# Patient Record
Sex: FEMALE | Race: WHITE
[De-identification: names, ages, dates, MRNs, and addresses within clinical notes are randomized per-mention and may not be internally consistent; named-entity substitution may affect disease eponyms.]

---

## 2019-11-19 ENCOUNTER — HOSPITAL ENCOUNTER (OUTPATIENT)
Dept: HOSPITAL 75 - LAB | Age: 69
End: 2019-11-19
Attending: FAMILY MEDICINE
Payer: SELF-PAY

## 2019-11-19 DIAGNOSIS — R42: ICD-10-CM

## 2019-11-19 DIAGNOSIS — E78.5: ICD-10-CM

## 2019-11-19 DIAGNOSIS — I10: Primary | ICD-10-CM

## 2019-11-19 LAB
ALBUMIN SERPL-MCNC: 4.8 GM/DL (ref 3.2–4.5)
ALP SERPL-CCNC: 73 U/L (ref 40–136)
ALT SERPL-CCNC: 18 U/L (ref 0–55)
BILIRUB SERPL-MCNC: 0.8 MG/DL (ref 0.1–1)
BUN/CREAT SERPL: 21
CALCIUM SERPL-MCNC: 9.7 MG/DL (ref 8.5–10.1)
CHLORIDE SERPL-SCNC: 104 MMOL/L (ref 98–107)
CHOLEST SERPL-MCNC: 191 MG/DL (ref ?–200)
CO2 SERPL-SCNC: 27 MMOL/L (ref 21–32)
CREAT SERPL-MCNC: 1.06 MG/DL (ref 0.6–1.3)
ERYTHROCYTE [DISTWIDTH] IN BLOOD BY AUTOMATED COUNT: 14.8 % (ref 10–14.5)
GFR SERPLBLD BASED ON 1.73 SQ M-ARVRAT: 51 ML/MIN
GLUCOSE SERPL-MCNC: 113 MG/DL (ref 70–105)
HCT VFR BLD CALC: 40 % (ref 35–52)
HDLC SERPL-MCNC: 53 MG/DL (ref 40–60)
HGB BLD-MCNC: 13 G/DL (ref 11.5–16)
MCH RBC QN AUTO: 28 PG (ref 25–34)
MCHC RBC AUTO-ENTMCNC: 33 G/DL (ref 32–36)
MCV RBC AUTO: 85 FL (ref 80–99)
PLATELET # BLD: 215 10^3/UL (ref 130–400)
PMV BLD AUTO: 10 FL (ref 7.4–10.4)
POTASSIUM SERPL-SCNC: 3.8 MMOL/L (ref 3.6–5)
PROT SERPL-MCNC: 7.6 GM/DL (ref 6.4–8.2)
SODIUM SERPL-SCNC: 141 MMOL/L (ref 135–145)
TRIGL SERPL-MCNC: 231 MG/DL (ref ?–150)
VLDLC SERPL CALC-MCNC: 46 MG/DL (ref 5–40)
WBC # BLD AUTO: 5.3 10^3/UL (ref 4.3–11)

## 2019-11-19 PROCEDURE — 84443 ASSAY THYROID STIM HORMONE: CPT

## 2019-11-19 PROCEDURE — 80061 LIPID PANEL: CPT

## 2019-11-19 PROCEDURE — 80053 COMPREHEN METABOLIC PANEL: CPT

## 2019-11-19 PROCEDURE — 85027 COMPLETE CBC AUTOMATED: CPT

## 2019-11-19 PROCEDURE — 36415 COLL VENOUS BLD VENIPUNCTURE: CPT

## 2021-12-15 ENCOUNTER — HOSPITAL ENCOUNTER (OUTPATIENT)
Dept: HOSPITAL 75 - LAB | Age: 71
End: 2021-12-15
Attending: FAMILY MEDICINE
Payer: SELF-PAY

## 2021-12-15 DIAGNOSIS — I10: Primary | ICD-10-CM

## 2021-12-15 DIAGNOSIS — E78.5: ICD-10-CM

## 2021-12-15 LAB
ALBUMIN SERPL-MCNC: 4.4 GM/DL (ref 3.2–4.5)
ALP SERPL-CCNC: 74 U/L (ref 40–136)
ALT SERPL-CCNC: 28 U/L (ref 0–55)
BILIRUB SERPL-MCNC: 0.9 MG/DL (ref 0.1–1)
BUN/CREAT SERPL: 18
CALCIUM SERPL-MCNC: 9.9 MG/DL (ref 8.5–10.1)
CHLORIDE SERPL-SCNC: 105 MMOL/L (ref 98–107)
CHOLEST SERPL-MCNC: 193 MG/DL (ref ?–200)
CO2 SERPL-SCNC: 21 MMOL/L (ref 21–32)
CREAT SERPL-MCNC: 0.96 MG/DL (ref 0.6–1.3)
GFR SERPLBLD BASED ON 1.73 SQ M-ARVRAT: 57 ML/MIN
GLUCOSE SERPL-MCNC: 115 MG/DL (ref 70–105)
HCT VFR BLD CALC: 37 % (ref 35–52)
HDLC SERPL-MCNC: 53 MG/DL (ref 40–60)
HGB BLD-MCNC: 12.3 G/DL (ref 11.5–16)
MCH RBC QN AUTO: 28 PG (ref 25–34)
MCHC RBC AUTO-ENTMCNC: 34 G/DL (ref 32–36)
MCV RBC AUTO: 84 FL (ref 80–99)
PLATELET # BLD: 220 10^3/UL (ref 130–400)
PMV BLD AUTO: 10.5 FL (ref 9–12.2)
POTASSIUM SERPL-SCNC: 3.7 MMOL/L (ref 3.6–5)
PROT SERPL-MCNC: 7.2 GM/DL (ref 6.4–8.2)
SODIUM SERPL-SCNC: 141 MMOL/L (ref 135–145)
TRIGL SERPL-MCNC: 379 MG/DL (ref ?–150)
VLDLC SERPL CALC-MCNC: 76 MG/DL (ref 5–40)
WBC # BLD AUTO: 6.6 10^3/UL (ref 4.3–11)

## 2021-12-15 PROCEDURE — 36415 COLL VENOUS BLD VENIPUNCTURE: CPT

## 2021-12-15 PROCEDURE — 80061 LIPID PANEL: CPT

## 2021-12-15 PROCEDURE — 85027 COMPLETE CBC AUTOMATED: CPT

## 2021-12-15 PROCEDURE — 80053 COMPREHEN METABOLIC PANEL: CPT

## 2023-06-29 ENCOUNTER — HOSPITAL ENCOUNTER (OUTPATIENT)
Dept: HOSPITAL 75 - LAB | Age: 73
End: 2023-06-29
Attending: FAMILY MEDICINE
Payer: SELF-PAY

## 2023-06-29 DIAGNOSIS — I10: Primary | ICD-10-CM

## 2023-06-29 DIAGNOSIS — E78.5: ICD-10-CM

## 2023-06-29 LAB
ALBUMIN SERPL-MCNC: 4.6 GM/DL (ref 3.2–4.5)
ALP SERPL-CCNC: 65 U/L (ref 40–136)
ALT SERPL-CCNC: 20 U/L (ref 0–55)
BILIRUB SERPL-MCNC: 0.9 MG/DL (ref 0.1–1)
BUN/CREAT SERPL: 19
CALCIUM SERPL-MCNC: 9.9 MG/DL (ref 8.5–10.1)
CHLORIDE SERPL-SCNC: 107 MMOL/L (ref 98–107)
CHOLEST SERPL-MCNC: 185 MG/DL (ref ?–200)
CO2 SERPL-SCNC: 22 MMOL/L (ref 21–32)
CREAT SERPL-MCNC: 1.17 MG/DL (ref 0.6–1.3)
GFR SERPLBLD BASED ON 1.73 SQ M-ARVRAT: 49 ML/MIN
GLUCOSE SERPL-MCNC: 116 MG/DL (ref 70–105)
HCT VFR BLD CALC: 39 % (ref 35–52)
HDLC SERPL-MCNC: 53 MG/DL (ref 40–60)
HGB BLD-MCNC: 13 G/DL (ref 11.5–16)
MCH RBC QN AUTO: 29 PG (ref 25–34)
MCHC RBC AUTO-ENTMCNC: 33 G/DL (ref 32–36)
MCV RBC AUTO: 86 FL (ref 80–99)
PLATELET # BLD: 204 10^3/UL (ref 130–400)
PMV BLD AUTO: 10.2 FL (ref 9–12.2)
POTASSIUM SERPL-SCNC: 4.1 MMOL/L (ref 3.6–5)
PROT SERPL-MCNC: 7.4 GM/DL (ref 6.4–8.2)
SODIUM SERPL-SCNC: 141 MMOL/L (ref 135–145)
TRIGL SERPL-MCNC: 346 MG/DL (ref ?–150)
VLDLC SERPL CALC-MCNC: 69 MG/DL (ref 5–40)
WBC # BLD AUTO: 6.8 10^3/UL (ref 4.3–11)

## 2023-06-29 PROCEDURE — 80061 LIPID PANEL: CPT

## 2023-06-29 PROCEDURE — 84443 ASSAY THYROID STIM HORMONE: CPT

## 2023-06-29 PROCEDURE — 85027 COMPLETE CBC AUTOMATED: CPT

## 2023-06-29 PROCEDURE — 36415 COLL VENOUS BLD VENIPUNCTURE: CPT

## 2023-06-29 PROCEDURE — 80053 COMPREHEN METABOLIC PANEL: CPT

## 2023-09-15 ENCOUNTER — HOSPITAL ENCOUNTER (OUTPATIENT)
Dept: HOSPITAL 75 - LAB | Age: 73
End: 2023-09-15
Attending: FAMILY MEDICINE
Payer: SELF-PAY

## 2023-09-15 DIAGNOSIS — D68.51: Primary | ICD-10-CM

## 2023-09-15 PROCEDURE — 36415 COLL VENOUS BLD VENIPUNCTURE: CPT

## 2023-09-15 PROCEDURE — 81241 F5 GENE: CPT

## 2023-09-19 ENCOUNTER — HOSPITAL ENCOUNTER (OUTPATIENT)
Dept: HOSPITAL 75 - RAD | Age: 73
End: 2023-09-19
Attending: FAMILY MEDICINE
Payer: SELF-PAY

## 2023-09-19 DIAGNOSIS — I82.409: Primary | ICD-10-CM

## 2023-09-19 PROCEDURE — 93970 EXTREMITY STUDY: CPT

## 2023-09-19 NOTE — DIAGNOSTIC IMAGING REPORT
PROCEDURE: 

US Venous Lower Ext Fredrick.



TECHNIQUE: 

Multiple Real-time grayscale images were obtained over the lower

extremities in various projections bilaterally. Additional duplex

Doppler and color Doppler images were also obtained.



INDICATION: 

Blood clots.



FINDINGS:

There is no evidence of right or left lower extremity DVT. Both

lower extremity deep venous systems demonstrate normal

compressibility with normal response to augmentation and

Valsalva. No fluid collection or mass is detected.



IMPRESSION: 

No evidence of right or left lower extremity DVT.



Dictated by: 



  Dictated on workstation # [id number]